# Patient Record
Sex: FEMALE | Race: WHITE | Employment: PART TIME | URBAN - METROPOLITAN AREA
[De-identification: names, ages, dates, MRNs, and addresses within clinical notes are randomized per-mention and may not be internally consistent; named-entity substitution may affect disease eponyms.]

---

## 2018-05-10 ENCOUNTER — HOSPITAL ENCOUNTER (EMERGENCY)
Facility: CLINIC | Age: 48
Discharge: HOME | End: 2018-05-10
Attending: EMERGENCY MEDICINE | Admitting: EMERGENCY MEDICINE
Payer: COMMERCIAL

## 2018-05-10 VITALS
TEMPERATURE: 97.4 F | SYSTOLIC BLOOD PRESSURE: 100 MMHG | OXYGEN SATURATION: 96 % | RESPIRATION RATE: 13 BRPM | DIASTOLIC BLOOD PRESSURE: 91 MMHG

## 2018-05-10 DIAGNOSIS — R00.2 PALPITATIONS: ICD-10-CM

## 2018-05-10 LAB
ANION GAP SERPL CALCULATED.3IONS-SCNC: 7 MMOL/L (ref 3–14)
BASOPHILS # BLD AUTO: 0 10E9/L (ref 0–0.2)
BASOPHILS NFR BLD AUTO: 0.2 %
BUN SERPL-MCNC: 13 MG/DL (ref 7–30)
CALCIUM SERPL-MCNC: 8.9 MG/DL (ref 8.5–10.1)
CHLORIDE SERPL-SCNC: 107 MMOL/L (ref 94–109)
CO2 SERPL-SCNC: 27 MMOL/L (ref 20–32)
CREAT SERPL-MCNC: 0.74 MG/DL (ref 0.52–1.04)
DIFFERENTIAL METHOD BLD: NORMAL
EOSINOPHIL # BLD AUTO: 0.1 10E9/L (ref 0–0.7)
EOSINOPHIL NFR BLD AUTO: 1.9 %
ERYTHROCYTE [DISTWIDTH] IN BLOOD BY AUTOMATED COUNT: 12.2 % (ref 10–15)
GFR SERPL CREATININE-BSD FRML MDRD: 83 ML/MIN/1.7M2
GLUCOSE SERPL-MCNC: 112 MG/DL (ref 70–99)
HCT VFR BLD AUTO: 42.4 % (ref 35–47)
HGB BLD-MCNC: 14.5 G/DL (ref 11.7–15.7)
IMM GRANULOCYTES # BLD: 0 10E9/L (ref 0–0.4)
IMM GRANULOCYTES NFR BLD: 0.2 %
INTERPRETATION ECG - MUSE: NORMAL
LYMPHOCYTES # BLD AUTO: 1.5 10E9/L (ref 0.8–5.3)
LYMPHOCYTES NFR BLD AUTO: 36.9 %
MAGNESIUM SERPL-MCNC: 2.3 MG/DL (ref 1.6–2.3)
MCH RBC QN AUTO: 29.8 PG (ref 26.5–33)
MCHC RBC AUTO-ENTMCNC: 34.2 G/DL (ref 31.5–36.5)
MCV RBC AUTO: 87 FL (ref 78–100)
MONOCYTES # BLD AUTO: 0.3 10E9/L (ref 0–1.3)
MONOCYTES NFR BLD AUTO: 7 %
NEUTROPHILS # BLD AUTO: 2.2 10E9/L (ref 1.6–8.3)
NEUTROPHILS NFR BLD AUTO: 53.8 %
NRBC # BLD AUTO: 0 10*3/UL
NRBC BLD AUTO-RTO: 0 /100
PLATELET # BLD AUTO: 201 10E9/L (ref 150–450)
POTASSIUM SERPL-SCNC: 3.5 MMOL/L (ref 3.4–5.3)
RBC # BLD AUTO: 4.86 10E12/L (ref 3.8–5.2)
SODIUM SERPL-SCNC: 141 MMOL/L (ref 133–144)
WBC # BLD AUTO: 4.2 10E9/L (ref 4–11)

## 2018-05-10 PROCEDURE — 93005 ELECTROCARDIOGRAM TRACING: CPT

## 2018-05-10 PROCEDURE — 83735 ASSAY OF MAGNESIUM: CPT | Performed by: EMERGENCY MEDICINE

## 2018-05-10 PROCEDURE — 85025 COMPLETE CBC W/AUTO DIFF WBC: CPT | Performed by: EMERGENCY MEDICINE

## 2018-05-10 PROCEDURE — 0296T ZIO PATCH HOLTER: CPT | Performed by: EMERGENCY MEDICINE

## 2018-05-10 PROCEDURE — 80048 BASIC METABOLIC PNL TOTAL CA: CPT | Performed by: EMERGENCY MEDICINE

## 2018-05-10 PROCEDURE — 0298T ZZC EXT ECG > 48HR TO 21 DAY REVIEW AND INTERPRETATN: CPT | Performed by: INTERNAL MEDICINE

## 2018-05-10 PROCEDURE — 99285 EMERGENCY DEPT VISIT HI MDM: CPT | Mod: 25

## 2018-05-10 ASSESSMENT — ENCOUNTER SYMPTOMS
SHORTNESS OF BREATH: 0
DIARRHEA: 0
CHILLS: 0
PALPITATIONS: 1
VOMITING: 0
NAUSEA: 0
FEVER: 0
LIGHT-HEADEDNESS: 0

## 2018-05-10 NOTE — DISCHARGE INSTRUCTIONS
"  * Heart Palpitations    Palpitations refers to the feeling that your heart is beating hard, fast or irregular. Some people describe it as \"pounding\" or \"skipped beats\". Palpitations may occur in persons with heart disease, but can also occur in healthy persons. Your doctor does not believe that anything dangerous is causing your symptoms at this time.  Heart-Related Causes:    Arrhythmia (a change from the heart's normal rhythm)    Disease of the heart valves  Non-Heart-Related Causes:    Certain medicines (such as asthma inhalers and decongestants)    Some herbal supplements, energy drinks and pills, and weight loss pills    Illegal stimulant drugs (such as cocaine, crank, methamphetamine, PCP)    Caffeine, alcohol and tobacco    Medical conditions such as thyroid disease, anemia, anxiety and panic disorder  Sometimes the cause cannot be found.  Home Care:  1. Avoid excess caffeine, alcohol, tobacco and any stimulant drugs.  2. Tell your doctor about any prescription or over-the-counter or herbal medicines you take.  Follow Up  with your doctor or as advised by our staff.  Get Prompt Medical Attention  if any of the following occur together with palpitations:    Weakness, dizziness, light-headed or fainting    Chest pain or shortness of breath    Rapid heart rate (over 120 beats per minute, at rest)    Palpitations that lasts over 20 minutes    Weakness of an arm or leg or one side of the face    Difficulty with speech or vision    4330-7692 [employer]. [address] [place] [address]. All rights reserved. This information is not intended as a substitute for professional medical care. Always follow your healthcare professional's instructions.  This information has been modified by your health care provider with permission from the publisher.        "

## 2018-05-10 NOTE — ED PROVIDER NOTES
History     Chief Complaint:  Palpitations    The history is provided by the patient.      Katie Wilkinson is a 47 year old female with a history of supraventricular tachycardia with adenosine conversion who presents with palpitations. The patient states she woke up this morning with palpitations and promptly recorded her pulse, which she noted was 100 bpm. She reports she felt lightheaded as if she were going to faint. She notes she had to get her kids ready for school at the time, but continued to have the palpitations afterwards, so she came to the ED for further evaluation. The patient reports her heart felt like it was pounding and she continued to feel lightheaded en route to the ED. On presentation to the ED, she notes the lightheadedness has subsided and reports her heart feels like it is beating slower, but still does not feel normal. The patient denies any dyspnea or recent vomiting or diarrhea. Of note, the patient states her daughter is sick right now, and she has been taking Airborne as a preventative measure. She also denies any history of heart procedures or ablations.    Allergies:  Advil: cold symptoms  Aspirin: red blotches     Medications:    Estradiol patch  Progesterone  Magnesium  Testosterone cypionate  Vitamin D  Calcium    Past Medical History:    Palpitations  Supraventricular tachycardia  The patient denies any history of hypertension.    Past Surgical History:     section  The patient denies any heart procedures or ablations.    Family History:    Diabetes  Hypertension  Hyperlipidemia  Myocardial infarction x2  Arrhythmia    Social History:  Smoking status: No  Alcohol use: Yes, rarely  PCP: Madison Wylie  Presents to the ED with her spouse  Marital Status:  [2]     Review of Systems   Constitutional: Negative for chills and fever.   Respiratory: Negative for shortness of breath.    Cardiovascular: Positive for palpitations.   Gastrointestinal: Negative for  diarrhea, nausea and vomiting.   Neurological: Negative for light-headedness.   All other systems reviewed and are negative.    Physical Exam     Patient Vitals for the past 24 hrs:   BP Temp Temp src Heart Rate Resp SpO2   05/10/18 1045 97/83 - - 87 - 98 %   05/10/18 1030 103/75 - - 98 - 97 %   05/10/18 1015 96/77 - - 92 - 98 %   05/10/18 1000 105/83 - - 96 - 100 %   05/10/18 0945 104/81 - - 105 - 99 %   05/10/18 0944 - - - 93 15 98 %   05/10/18 0930 102/74 - - 94 12 100 %   05/10/18 0923 107/81 - - 96 - 100 %   05/10/18 0915 - 97.4  F (36.3  C) Oral - - 100 %   05/10/18 0914 - - - - - 100 %   05/10/18 0913 115/78 - - 115 18 100 %   05/10/18 0912 - - - - - 98 %   05/10/18 0911 115/78 - - - - -     Physical Exam  Constitutional: Patient appears well-developed and well-nourished. There is no acute distress.   Head: No external signs of trauma noted.  Neck: No JVD noted  Eyes: Pupils are equal, round, and reactive to light.   Cardiovascular: Normal rate, regular rhythm and normal heart sounds.  Exam reveals no gallop and no friction rub.  No murmur heard. Equal B/L peripheral pulses.  Pulmonary/Chest: Effort normal and breath sounds normal. No respiratory distress. Patient has no wheezes. Patient has no rales.   Abdominal: Soft. There is no tenderness.   Extremities: No edema noted  Neurological: Patient is alert and oriented to person, place, and time.   Skin: Skin is warm and dry. There is no diaphoresis noted.   Psychiatric: The patient does seem mildly anxious    Emergency Department Course   ECG (09:13:37):  Rate 98 bpm. VT interval 160. QRS duration 84. QT/QTc 334/426. P-R-T axes 73 79 75. Normal sinus rhythm. Normal ECG. No significant change compared to ECG dated 6/30/15. Interpreted at 0915 by Freddy Cook DO.    Laboratory:  CBC: WNL (WBC 4.2, HGB 14.5, )   BMP: Glucose 112 (H), o/w WNL (Creatinine 0.74)  Magnesium: 2.3    Emergency Department Course:  Past medical records, nursing notes,  and vitals reviewed.  0912: I performed an exam of the patient and obtained history, as documented above.  ECG performed, results above.  IV inserted and blood drawn. The patient was placed on continuous cardiac monitoring and pulse oximetry.    1051: I rechecked the patient. Explained findings to the patient and her spouse.    1103: Cardiology states they can place a Zio patch monitor here in the ED. I relayed this information to the patient. The patch was subsequently placed prior to discharge.    I rechecked the patient. Findings and plan explained to the patient. Patient discharged home with instructions regarding supportive care, medications, and reasons to return. The importance of close follow-up was reviewed.     Impression & Plan    Medical Decision Making:  This 47-year-old female patient presents the ED due to palpitations. Please see the HPI and exam for specifics. The patient has remained well in the ED. All of the recorded heart rates have shown improvement from her initial triage vital signs. We were able to get [facility] cardiology and they were able to place a Zio patch on the patient.  The patient has a cardiologist in [place] that she can see and was instructed to follow with her cardiologist in [place]. She has remained well in the ED. Anticipatory guidance given prior to discharge.    Impression:    ICD-10-CM   1. Palpitations R00.2     Disposition: Discharged to home    Discharge Medications: None    Madelyn Max  5/10/2018   [facility] EMERGENCY DEPARTMENT    Madelyn BELTRÁN, am serving as a scribe at 9:12 AM on 5/10/2018 to document services personally performed by Freddy Cook DO based on my observations and the provider's statements to me.      Freddy Cook DO  05/10/18 1140

## 2018-05-10 NOTE — ED AVS SNAPSHOT
" [facility] Emergency Department    [address] [place] [address]    Phone:  [phone]    Fax:  [phone]                                       Katie Wilkinson   MRN: [id number]    Department:  [facility] Emergency Department   Date of Visit:  5/10/2018           Patient Information     Date Of Birth          1970        Your diagnoses for this visit were:     Palpitations        You were seen by Freddy Cook DO.      Follow-up Information     Follow up with Your cardiologist. Call today.    Why:  To schedule follow up        Call Madison Wylie    Why:  As needed    Contact information:    [facility]  [address]   [place] [address]  [phone]          Follow up with [facility] Emergency Department.    Specialty:  EMERGENCY MEDICINE    Why:  If symptoms worsen    Contact information:    [address]  [facility] [address]  [phone]        Discharge Instructions         * Heart Palpitations    Palpitations refers to the feeling that your heart is beating hard, fast or irregular. Some people describe it as \"pounding\" or \"skipped beats\". Palpitations may occur in persons with heart disease, but can also occur in healthy persons. Your doctor does not believe that anything dangerous is causing your symptoms at this time.  Heart-Related Causes:    Arrhythmia (a change from the heart's normal rhythm)    Disease of the heart valves  Non-Heart-Related Causes:    Certain medicines (such as asthma inhalers and decongestants)    Some herbal supplements, energy drinks and pills, and weight loss pills    Illegal stimulant drugs (such as cocaine, crank, methamphetamine, PCP)    Caffeine, alcohol and tobacco    Medical conditions such as thyroid disease, anemia, anxiety and panic disorder  Sometimes the cause cannot be found.  Home Care:  1. Avoid excess caffeine, alcohol, tobacco and any stimulant " drugs.  2. Tell your doctor about any prescription or over-the-counter or herbal medicines you take.  Follow Up  with your doctor or as advised by our staff.  Get Prompt Medical Attention  if any of the following occur together with palpitations:    Weakness, dizziness, light-headed or fainting    Chest pain or shortness of breath    Rapid heart rate (over 120 beats per minute, at rest)    Palpitations that lasts over 20 minutes    Weakness of an arm or leg or one side of the face    Difficulty with speech or vision    8553-6123 [employer]. [address] [place] [address]. All rights reserved. This information is not intended as a substitute for professional medical care. Always follow your healthcare professional's instructions.  This information has been modified by your health care provider with permission from the publisher.          24 Hour Appointment Hotline       To make an appointment at any [facility], call [phone] ([phone]). If you don't have a family doctor or clinic, we will help you find one. [place] [place] clinics are conveniently located to serve the needs of you and your family.             Review of your medicines      Our records show that you are taking the medicines listed below. If these are incorrect, please call your family doctor or clinic.        Dose / Directions Last dose taken    ADRENAL PO        Take by mouth daily   Refills:  0        calcium carbonate 500 MG tablet   Commonly known as:  OS-ALDO 500 mg [place]. Ca   Dose:  500 mg        Take 500 mg by mouth daily   Refills:  0        cyanocobalamin 1000 MCG tablet   Commonly known as:  vitamin  B-12   Dose:  1000 mcg        Take 1,000 mcg by mouth daily   Refills:  0        estradiol 0.05 MG/24HR BIW patch   Commonly known as:  VIVELLE-DOT   Dose:  1 patch        Place 1 patch onto the skin twice a week   Refills:  0        magnesium 250 MG tablet   Dose:  1 tablet        Take 1 tablet by mouth daily    Refills:  0        PRENATAL #2 OR        daily   Refills:  0        Progesterone 100 MG Caps   Dose:  100 mg        Take 100 mg by mouth daily   Refills:  0        saccharomyces boulardii 250 MG capsule   Commonly known as:  FLORASTOR   Dose:  250 mg        Take 250 mg by mouth 2 times daily   Refills:  0        TESTOSTERONE CYPIONATE IM        Inject into the muscle once a week 0.05ML a week   Refills:  0        vitamin D 1000 units capsule   Dose:  2000 Units        Take 2,000 Units by mouth daily   Refills:  0                Procedures and tests performed during your visit     Basic metabolic panel    CBC with platelets differential    EKG 12 lead    Magnesium      Orders Needing Specimen Collection     None      Pending Results     Date and Time Order Name Status Description    5/10/2018 0917 EKG 12 lead Preliminary             Pending Culture Results     No orders found from 5/8/2018 to 5/11/2018.            Pending Results Instructions     If you had any lab results that were not finalized at the time of your Discharge, you can call the ED Lab Result RN at [phone]. You will be contacted by this team for any positive Lab results or changes in treatment. The nurses are available 7 days a week from 10A to 6:30P.  You can leave a message 24 hours per day and they will return your call.        Test Results From Your Hospital Stay        5/10/2018 10:32 AM      Component Results     Component Value Ref Range & Units Status    Sodium 141 133 - 144 mmol/L Final    Potassium 3.5 3.4 - 5.3 mmol/L Final    Chloride 107 94 - 109 mmol/L Final    Carbon Dioxide 27 20 - 32 mmol/L Final    Anion Gap 7 3 - 14 mmol/L Final    Glucose 112 (H) 70 - 99 mg/dL Final    Urea Nitrogen 13 7 - 30 mg/dL Final    Creatinine 0.74 0.52 - 1.04 mg/dL Final    GFR Estimate 83 >60 mL/min/1.7m2 Final    Non  GFR Calc    GFR Estimate If Black >90 >60 mL/min/1.7m2 Final    African American GFR Calc    Calcium 8.9 8.5 - 10.1  mg/dL Final         5/10/2018 10:32 AM      Component Results     Component Value Ref Range & Units Status    Magnesium 2.3 1.6 - 2.3 mg/dL Final         5/10/2018 10:11 AM      Component Results     Component Value Ref Range & Units Status    WBC 4.2 4.0 - 11.0 10e9/L Final    RBC Count 4.86 3.8 - 5.2 10e12/L Final    Hemoglobin 14.5 11.7 - 15.7 g/dL Final    Hematocrit 42.4 35.0 - 47.0 % Final    MCV 87 78 - 100 fl Final    MCH 29.8 26.5 - 33.0 pg Final    MCHC 34.2 31.5 - 36.5 g/dL Final    RDW 12.2 10.0 - 15.0 % Final    Platelet Count 201 150 - 450 10e9/L Final    Diff Method Automated Method  Final    % Neutrophils 53.8 % Final    % Lymphocytes 36.9 % Final    % Monocytes 7.0 % Final    % Eosinophils 1.9 % Final    % Basophils 0.2 % Final    % Immature Granulocytes 0.2 % Final    Nucleated RBCs 0 0 /100 Final    Absolute Neutrophil 2.2 1.6 - 8.3 10e9/L Final    Absolute Lymphocytes 1.5 0.8 - 5.3 10e9/L Final    Absolute Monocytes 0.3 0.0 - 1.3 10e9/L Final    Absolute Eosinophils 0.1 0.0 - 0.7 10e9/L Final    Absolute Basophils 0.0 0.0 - 0.2 10e9/L Final    Abs Immature Granulocytes 0.0 0 - 0.4 10e9/L Final    Absolute Nucleated RBC 0.0  Final                Clinical Quality Measure: Blood Pressure Screening     Your blood pressure was checked while you were in the emergency department today. The last reading we obtained was  BP: 97/83 . Please read the guidelines below about what these numbers mean and what you should do about them.  If your systolic blood pressure (the top number) is less than 120 and your diastolic blood pressure (the bottom number) is less than 80, then your blood pressure is normal. There is nothing more that you need to do about it.  If your systolic blood pressure (the top number) is 120-139 or your diastolic blood pressure (the bottom number) is 80-89, your blood pressure may be higher than it should be. You should have your blood pressure rechecked within a year by a primary care  "provider.  If your systolic blood pressure (the top number) is 140 or greater or your diastolic blood pressure (the bottom number) is 90 or greater, you may have high blood pressure. High blood pressure is treatable, but if left untreated over time it can put you at risk for heart attack, stroke, or kidney failure. You should have your blood pressure rechecked by a primary care provider within the next 4 weeks.  If your provider in the emergency department today gave you specific instructions to follow-up with your doctor or provider even sooner than that, you should follow that instruction and not wait for up to 4 weeks for your follow-up visit.        Thank you for choosing [facility]       Thank you for choosing [facility] for your care. Our goal is always to provide you with excellent care. Hearing back from our patients is one way we can continue to improve our services. Please take a few minutes to complete the written survey that you may receive in the mail after you visit with us. Thank you!        [employer] Information     [employer] lets you send messages to your doctor, view your test results, renew your prescriptions, schedule appointments and more. To sign up, go to [email or web address] . Click on \"Log in\" on the left side of the screen, which will take you to the Welcome page. Then click on \"Sign up Now\" on the right side of the page.     You will be asked to enter the access code listed below, as well as some personal information. Please follow the directions to create your username and password.     Your access code is: [email or web address]  Expires: 2018 11:25 AM     Your access code will  in 90 days. If you need help or a new code, please call your [facility] clinic or [phone].        Care EveryWhere ID     This is your Care EveryWhere ID. This could be used by other organizations to access your [facility] medical records  [id number]        Equal Access to Services     JB AREVALO: Antwon juarez " teresita Eddy, julieta sierra, thierno mckeon, harlan modi. So [facility] [phone].    ATENCIÓN: Si habla español, tiene a godinez disposición servicios gratuitos de asistencia lingüística. Llame al [phone].    We comply with applicable federal civil rights laws and [place] laws. We do not discriminate on the basis of race, color, national origin, age, disability, sex, sexual orientation, or gender identity.            After Visit Summary       This is your record. Keep this with you and show to your community pharmacist(s) and doctor(s) at your next visit.

## 2018-05-10 NOTE — ED AVS SNAPSHOT
[facility] Emergency Department    [address]    [facility] [address]    Phone:  [phone]    Fax:  [phone]                                       Katie Wilkinson   MRN: [id number]    Department:  [facility] Emergency Department   Date of Visit:  5/10/2018           After Visit Summary Signature Page     I have received my discharge instructions, and my questions have been answered. I have discussed any challenges I see with this plan with the nurse or doctor.    ..........................................................................................................................................  Patient/Patient Representative Signature      ..........................................................................................................................................  Patient Representative Print Name and Relationship to Patient    ..................................................               ................................................  Date                                            Time    ..........................................................................................................................................  Reviewed by Signature/Title    ...................................................              ..............................................  Date                                                            Time

## 2018-05-22 ENCOUNTER — TELEPHONE (OUTPATIENT)
Dept: CARDIOLOGY | Facility: CLINIC | Age: 48
End: 2018-05-22

## 2018-05-22 NOTE — TELEPHONE ENCOUNTER
Ruddy Belcher MD  [facility] Nurse                     Monitor was unremarkable.  Please update pt on results.  Ruddy          Attempted to call patient - number is invalid. SP

## 2018-05-31 ENCOUNTER — OFFICE VISIT (OUTPATIENT)
Dept: CARDIOLOGY | Facility: CLINIC | Age: 48
End: 2018-05-31
Payer: COMMERCIAL

## 2018-05-31 VITALS
WEIGHT: 109.5 LBS | BODY MASS INDEX: 19.4 KG/M2 | DIASTOLIC BLOOD PRESSURE: 70 MMHG | SYSTOLIC BLOOD PRESSURE: 107 MMHG | HEART RATE: 73 BPM | HEIGHT: 63 IN

## 2018-05-31 DIAGNOSIS — I47.10 SVT (SUPRAVENTRICULAR TACHYCARDIA) (H): Primary | ICD-10-CM

## 2018-05-31 PROCEDURE — 99214 OFFICE O/P EST MOD 30 MIN: CPT | Mod: 25 | Performed by: INTERNAL MEDICINE

## 2018-05-31 PROCEDURE — 93000 ELECTROCARDIOGRAM COMPLETE: CPT | Performed by: INTERNAL MEDICINE

## 2018-05-31 NOTE — MR AVS SNAPSHOT
After Visit Summary   5/31/2018    Katie Wilkinson    MRN: [id number]           Patient Information     Date Of Birth          1970        Visit Information        Provider Department      5/31/2018 10:45 AM Ruddy Belcher MD [facility]   [place]        Today's Diagnoses     SVT (supraventricular tachycardia) (H)    -  1       Follow-ups after your visit        Your next 10 appointments already scheduled     Jul 02, 2018  1:30 PM CDT   Ech Limited with [email or web address]   [facility] ([facility])    [address]  [facility] [id number] [phone]           1.  Please bring or wear a comfortable two-piece outfit. 2.  You may eat, drink and take your normal medicines. 3.  For any questions that cannot be answered, please contact the ordering physician ***Please check-in at the [place] [facility] located in [address] in the [facility] building. When you are finished registering, please go to [address] and have a seat. The technician will call your name for the test.            Jul 12, 2018  1:00 PM CDT   Ep 90 Minute with [email or web address]   [facility] ([facility])    [address]  [place] [id number] [phone]              Future tests that were ordered for you today     Open Future Orders        Priority Expected Expires Ordered    Echocardiogram Limited Routine 6/7/2018 5/31/2019 5/31/2018    EP Ablation Procedures Routine 7/12/2018 5/31/2019 5/31/2018            Who to contact     If you have questions or need follow up information about today's clinic visit or your schedule please contact [facility]   DARREN directly at [phone].  Normal or non-critical lab and imaging results will be communicated to you by MyChart, letter or phone within 4 business days after the clinic has received the  "results. If you do not hear from us within 7 days, please contact the clinic through [employer] or phone. If you have a critical or abnormal lab result, we will notify you by phone as soon as possible.  Submit refill requests through [employer] or call your pharmacy and they will forward the refill request to us. Please allow 3 business days for your refill to be completed.          Additional Information About Your Visit        [employer] Information     [employer] lets you send messages to your doctor, view your test results, renew your prescriptions, schedule appointments and more. To sign up, go to [email or web address] . Click on \"Log in\" on the left side of the screen, which will take you to the Welcome page. Then click on \"Sign up Now\" on the right side of the page.     You will be asked to enter the access code listed below, as well as some personal information. Please follow the directions to create your username and password.     Your access code is: [email or web address]  Expires: 2018 11:25 AM     Your access code will  in 90 days. If you need help or a new code, please call your [facility] clinic or [phone].        Care EveryWhere ID     This is your Care EveryWhere ID. This could be used by other organizations to access your [facility] medical records  [id number]        Your Vitals Were     Pulse Height BMI (Body Mass Index)             73 1.594 m (5' 2.76\") 19.55 kg/m2          Blood Pressure from Last 3 Encounters:   18 107/70   05/10/18 (!) 100/91   06/30/15 100/70    Weight from Last 3 Encounters:   18 49.7 kg (109 lb 8 oz)   06/30/15 46.3 kg (102 lb)              We Performed the Following     EKG 12-lead complete w/read - Clinics (performed today)        Primary Care Provider Office Phone # Fax #    Madison DALY Wylie [phone]       [facility] [address] [place] [address]        Equal Access to Services     [facility] DONNY AH: julieta Kimball " thierno sierramadwight earlcongkatia quintanillaolayinka joelgiorgio liramichael rian. So [facility] [phone].    ATENCIÓN: Si ed rodríguez, tiene a godinez disposición servicios gratuitos de asistencia lingüística. Magalie al [phone].    We comply with applicable federal civil rights laws and [place] laws. We do not discriminate on the basis of race, color, national origin, age, disability, sex, sexual orientation, or gender identity.            Thank you!     Thank you for choosing [facility] HEART [facility]  for your care. Our goal is always to provide you with excellent care. Hearing back from our patients is one way we can continue to improve our services. Please take a few minutes to complete the written survey that you may receive in the mail after your visit with us. Thank you!             Your Updated Medication List - Protect others around you: Learn how to safely use, store and throw away your medicines at www.disposemymeds.org.          This list is accurate as of 5/31/18 12:01 PM.  Always use your most recent med list.                   Brand Name Dispense Instructions for use Diagnosis    calcium carbonate 500 MG tablet    OS-ALDO 500 mg [place]. [place]     Take 500 mg by mouth daily        cyanocobalamin 1000 MCG tablet    vitamin  B-12     Take 1,000 mcg by mouth daily        estradiol 0.05 MG/24HR BIW patch    VIVELLE-DOT     Place 1 patch onto the skin twice a week        magnesium 250 MG tablet      Take 1 tablet by mouth daily        PRENATAL #2 OR      daily        Progesterone 100 MG Caps      Take 100 mg by mouth daily Take two tabs (200mg) daily.        saccharomyces boulardii 250 MG capsule    FLORASTOR     Take 250 mg by mouth 2 times daily        TESTOSTERONE CYPIONATE IM      Inject into the muscle once a week 0.05ML a week        vitamin D 1000 units capsule      Take 2,000 Units by mouth daily

## 2018-05-31 NOTE — LETTER
5/31/2018      [facility] [address] [place] [address]      RE: Katie TRIPLETT Jaja       Dear Colleague,    I had the pleasure of seeing Katie Wilkinson in the [facility].    Service Date: 05/31/2018      REASON FOR VISIT:  Evaluation of SVT.      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      I last saw the patient in 06/2015.  At that time, she was having frequent episodes of SVT, requiring ER visits and treatment with adenosine.  During our last visit, we discussed about possibility of ablation; however, she was not interested.  I prescribed metoprolol pill-in-the-pocket approach.      Since 2015 she informs that she continues to have frequent episodes of SVT.  She has an average of 3 episodes per month.  Most of the times they last for about 20 minutes and she is able to tolerate them well.      However on 05/10 she had another episode that was more sustained and lasted for over 2 hours.  She came to the ED for evaluation.  However, by the time she arrived in the ED, the episode terminated.      After discharge, she wore a Zio Patch monitor for 3 days which was unremarkable.  Today, she denies any symptoms such as chest pain, lightheadedness, near-syncope or syncopal episode.  EKG done here today shows sinus rhythm without signs of preexcitation.  Echocardiogram obtained in 2014 revealed EF of 55% and no significant valve disease.      ASSESSMENT AND PLAN:   1.  Symptomatic paroxysmal SVT.  She affirms that SVT is now affecting her lifestyle as she is always scared of having an episode of SVT.      We discussed options including start pharmacotherapy or pursue catheter ablation procedure.  I explained the procedure in detail.  She is not interested in starting medications as she is concerned about her blood pressure.  She affirms that sometimes her  systolic  blood pressure can be as low as 80 mmHg.      She understands there is a 1%-2% risk complication associated with ablation procedure and she would like to proceed.  We will make arrangements to have the procedure scheduled for .  We will also repeat a limited echo prior to ablation.         RUDDY CALDERÓN MD             D: 2018   T: 2018   MT: DAY      Name:     ANISHA LO   MRN:      [id number]        Account:      [id number]   :      1970           Service Date: 2018      Document: [id number]           Outpatient Encounter Prescriptions as of 2018   Medication Sig Dispense Refill     calcium carbonate (OS-ALDO 500 MG [place]. [place]) 500 MG tablet Take 500 mg by mouth daily       Cholecalciferol (VITAMIN D) 1000 UNITS capsule Take 2,000 Units by mouth daily       cyanocobalamin (VITAMIN  B-12) 1000 MCG tablet Take 1,000 mcg by mouth daily       estradiol (VIVELLE-DOT) 0.05 MG/24HR patch Place 1 patch onto the skin twice a week       magnesium 250 MG tablet Take 1 tablet by mouth daily       PRENATAL #2 OR daily       Progesterone 100 MG CAPS Take 100 mg by mouth daily Take two tabs (200mg) daily.       saccharomyces boulardii (FLORASTOR) 250 MG capsule Take 250 mg by mouth 2 times daily       TESTOSTERONE CYPIONATE IM Inject into the muscle once a week 0.05ML a week       [DISCONTINUED] Misc Natural Products (ADRENAL PO) Take by mouth daily       No facility-administered encounter medications on file as of 2018.        Again, thank you for allowing me to participate in the care of your patient.      Sincerely,    Ruddy Calderón MD     [facility]

## 2018-05-31 NOTE — PROGRESS NOTES
"[id number]    Electrophysiology/ Clinic Note         H&P and Plan:           Ruddy Belcher MD    Physical Exam:  Vitals: /70  Pulse 73  Ht 1.594 m (5' 2.76\")  Wt 49.7 kg (109 lb 8 oz)  BMI 19.55 kg/m2    Constitutional:  AAO x3.  Pt is in NAD.  HEAD: normocephalic.  SKIN: Skin normal color, texture and turgor with no lesions or eruptions.  Eyes: PERRL, EOMI.  ENT:  Supple, normal JVP. No lymphadenopathy or thyroid enlargement.  Chest:  CTAB.  Cardiac:  RRR, normal  S1 and S2.  No murmurs rubs or gallop.    Abdomen:  Normal BS.  Soft, non-tender and non-distended.  No rebound or guarding.    Extremities:  Pedious pulses palpable B/L.  No LE edema noticed.   Neurological: Strength and sensation grossly symmetric and intact throughout.       CURRENT MEDICATIONS:  Current Outpatient Prescriptions   Medication Sig Dispense Refill     calcium carbonate (OS-ALDO 500 MG [place]. CA) 500 MG tablet Take 500 mg by mouth daily       Cholecalciferol (VITAMIN D) 1000 UNITS capsule Take 2,000 Units by mouth daily       cyanocobalamin (VITAMIN  B-12) 1000 MCG tablet Take 1,000 mcg by mouth daily       estradiol (VIVELLE-DOT) 0.05 MG/24HR patch Place 1 patch onto the skin twice a week       magnesium 250 MG tablet Take 1 tablet by mouth daily       PRENATAL #2 OR daily       Progesterone 100 MG CAPS Take 100 mg by mouth daily Take two tabs (200mg) daily.       saccharomyces boulardii (FLORASTOR) 250 MG capsule Take 250 mg by mouth 2 times daily       TESTOSTERONE CYPIONATE IM Inject into the muscle once a week 0.05ML a week         ALLERGIES     Allergies   Allergen Reactions     Advil [Alkylamines]      Cold sx, red eyes     Aspirin      Red blotches       PAST MEDICAL HISTORY:  Past Medical History:   Diagnosis Date     Palpitations      SVT (supraventricular tachycardia) (H) 2015       PAST SURGICAL HISTORY:  Past Surgical History:   Procedure Laterality Date      SECTION      twins       FAMILY HISTORY:  Family " History   Problem Relation Age of Onset     DIABETES Mother      pre diabetic     Hypertension Mother      mild     Hypertension Father      Hyperlipidemia Father      Myocardial Infarction Father      x2     Arrhythmia Father      Other - See Comments Father       from pneumonia       SOCIAL HISTORY:  Social History     Social History     Marital status:      Spouse name: N/A     Number of children: N/A     Years of education: N/A     Social History Main Topics     Smoking status: Never Smoker     Smokeless tobacco: Never Used     Alcohol use Yes      Comment: occ, twice a month     Drug use: None     Sexual activity: Not Asked     Other Topics Concern     Caffeine Concern No     1 cup maria esther a day     Sleep Concern No     Stress Concern No     Weight Concern No     Special Diet Yes     tries to follow gluten free     Exercise Yes     running, weights x5 days a week     Seat Belt Yes     Social History Narrative       Review of Systems:  Skin:  Negative     Eyes:  Negative    ENT:  Negative    Respiratory:  Negative    Cardiovascular:    Positive for;palpitations;lightheadedness;dizziness  Gastroenterology: Negative    Genitourinary:  Negative    Musculoskeletal:  Negative    Neurologic:  Negative    Psychiatric:  Negative    Heme/Lymph/Imm:  Negative    Endocrine:  Negative        Recent Lab Results:  LIPID RESULTS:  No results found for: CHOL, HDL, LDL, TRIG, CHOLHDLRATIO    LIVER ENZYME RESULTS:  Lab Results   Component Value Date    AST 31 2011    ALT 23 2011       CBC RESULTS:  Lab Results   Component Value Date    WBC 4.2 05/10/2018    RBC 4.86 05/10/2018    HGB 14.5 05/10/2018    HCT 42.4 05/10/2018    MCV 87 05/10/2018    MCH 29.8 05/10/2018    MCHC 34.2 05/10/2018    RDW 12.2 05/10/2018     05/10/2018       BMP RESULTS:  Lab Results   Component Value Date     05/10/2018    POTASSIUM 3.5 05/10/2018    CHLORIDE 107 05/10/2018    CO2 27 05/10/2018    ANIONGAP 7 05/10/2018      (H) 05/10/2018    BUN 13 05/10/2018    CR 0.74 05/10/2018    GFRESTIMATED 83 05/10/2018    GFRESTBLACK >90 05/10/2018    ALDO 8.9 05/10/2018        A1C RESULTS:  No results found for: A1C    INR RESULTS:  No results found for: INR      ECHOCARDIOGRAM  No results found for this or any previous visit (from the past 8760 hour(s)).      Orders Placed This Encounter   Procedures     EKG 12-lead complete w/read - Clinics (performed today)     EP Ablation Procedures     Echocardiogram Limited     No orders of the defined types were placed in this encounter.    Medications Discontinued During This Encounter   Medication Reason     Misc Natural Products (ADRENAL PO) Stopped by Patient         Encounter Diagnosis   Name Primary?     SVT (supraventricular tachycardia) (H) Yes         CC  No referring provider defined for this encounter.

## 2018-05-31 NOTE — PROGRESS NOTES
Service Date: 05/31/2018      REASON FOR VISIT:  Evaluation of SVT.      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      I last saw the patient in 06/2015.  At that time, she was having frequent episodes of SVT, requiring ER visits and treatment with adenosine.  During our last visit, we discussed about possibility of ablation; however, she was not interested.  I prescribed metoprolol pill-in-the-pocket approach.      Since 2015 she informs that she continues to have frequent episodes of SVT.  She has an average of 3 episodes per month.  Most of the times they last for about 20 minutes and she is able to tolerate them well.      However on 05/10 she had another episode that was more sustained and lasted for over 2 hours.  She came to the ED for evaluation.  However, by the time she arrived in the ED, the episode terminated.      After discharge, she wore a Zio Patch monitor for 3 days which was unremarkable.  Today, she denies any symptoms such as chest pain, lightheadedness, near-syncope or syncopal episode.  EKG done here today shows sinus rhythm without signs of preexcitation.  Echocardiogram obtained in 2014 revealed EF of 55% and no significant valve disease.      ASSESSMENT AND PLAN:   1.  Symptomatic paroxysmal SVT.  She affirms that SVT is now affecting her lifestyle as she is always scared of having an episode of SVT.      We discussed options including start pharmacotherapy or pursue catheter ablation procedure.  I explained the procedure in detail.  She is not interested in starting medications as she is concerned about her blood pressure.  She affirms that sometimes her systolic  blood pressure can be as low as 80 mmHg.      She understands there is a 1%-2% risk complication associated with ablation procedure and she would like to proceed.  We will make arrangements to have the procedure scheduled for 07/12.  We  will also repeat a limited echo prior to ablation.         NIKO CALDERÓN MD             D: 2018   T: 2018   MT: DAY      Name:     ANISHA LO   MRN:      [id number]        Account:      [id number]   :      1970           Service Date: 2018      Document: [id number]

## 2018-05-31 NOTE — LETTER
"5/31/2018    [facility] [address]   [place] [address]    RE: Katie Wilkinson       Dear Colleague,    I had the pleasure of seeing Katie Wilkinson in the [facility].    [id number]    Electrophysiology/ Clinic Note         H&P and Plan:           Ruddy Belcher MD    Physical Exam:  Vitals: /70  Pulse 73  Ht 1.594 m (5' 2.76\")  Wt 49.7 kg (109 lb 8 oz)  BMI 19.55 kg/m2    Constitutional:  AAO x3.  Pt is in NAD.  HEAD: normocephalic.  SKIN: Skin normal color, texture and turgor with no lesions or eruptions.  Eyes: PERRL, EOMI.  ENT:  Supple, normal JVP. No lymphadenopathy or thyroid enlargement.  Chest:  CTAB.  Cardiac:  RRR, normal  S1 and S2.  No murmurs rubs or gallop.    Abdomen:  Normal BS.  Soft, non-tender and non-distended.  No rebound or guarding.    Extremities:  Pedious pulses palpable B/L.  No LE edema noticed.   Neurological: Strength and sensation grossly symmetric and intact throughout.       CURRENT MEDICATIONS:  Current Outpatient Prescriptions   Medication Sig Dispense Refill     calcium carbonate (OS-ALDO 500 MG [place]. [place]) 500 MG tablet Take 500 mg by mouth daily       Cholecalciferol (VITAMIN D) 1000 UNITS capsule Take 2,000 Units by mouth daily       cyanocobalamin (VITAMIN  B-12) 1000 MCG tablet Take 1,000 mcg by mouth daily       estradiol (VIVELLE-DOT) 0.05 MG/24HR patch Place 1 patch onto the skin twice a week       magnesium 250 MG tablet Take 1 tablet by mouth daily       PRENATAL #2 OR daily       Progesterone 100 MG CAPS Take 100 mg by mouth daily Take two tabs (200mg) daily.       saccharomyces boulardii (FLORASTOR) 250 MG capsule Take 250 mg by mouth 2 times daily       TESTOSTERONE CYPIONATE IM Inject into the muscle once a week 0.05ML a week         ALLERGIES     Allergies   Allergen Reactions     Advil [Alkylamines]      Cold sx, red eyes     Aspirin      Red blotches       PAST MEDICAL HISTORY:  Past Medical " History:   Diagnosis Date     Palpitations      SVT (supraventricular tachycardia) (H) 2015       PAST SURGICAL HISTORY:  Past Surgical History:   Procedure Laterality Date      SECTION      twins       FAMILY HISTORY:  Family History   Problem Relation Age of Onset     DIABETES Mother      pre diabetic     Hypertension Mother      mild     Hypertension Father      Hyperlipidemia Father      Myocardial Infarction Father      x2     Arrhythmia Father      Other - See Comments Father       from pneumonia       SOCIAL HISTORY:  Social History     Social History     Marital status:      Spouse name: N/A     Number of children: N/A     Years of education: N/A     Social History Main Topics     Smoking status: Never Smoker     Smokeless tobacco: Never Used     Alcohol use Yes      Comment: occ, twice a month     Drug use: None     Sexual activity: Not Asked     Other Topics Concern     Caffeine Concern No     1 cup maria esther a day     Sleep Concern No     Stress Concern No     Weight Concern No     Special Diet Yes     tries to follow gluten free     Exercise Yes     running, weights x5 days a week     Seat Belt Yes     Social History Narrative       Review of Systems:  Skin:  Negative     Eyes:  Negative    ENT:  Negative    Respiratory:  Negative    Cardiovascular:    Positive for;palpitations;lightheadedness;dizziness  Gastroenterology: Negative    Genitourinary:  Negative    Musculoskeletal:  Negative    Neurologic:  Negative    Psychiatric:  Negative    Heme/Lymph/Imm:  Negative    Endocrine:  Negative        Recent Lab Results:  LIPID RESULTS:  No results found for: CHOL, HDL, LDL, TRIG, CHOLHDLRATIO    LIVER ENZYME RESULTS:  Lab Results   Component Value Date    AST 31 2011    ALT 23 2011       CBC RESULTS:  Lab Results   Component Value Date    WBC 4.2 05/10/2018    RBC 4.86 05/10/2018    HGB 14.5 05/10/2018    HCT 42.4 05/10/2018    MCV 87 05/10/2018    MCH 29.8 05/10/2018    [facility]  34.2 05/10/2018    RDW 12.2 05/10/2018     05/10/2018       BMP RESULTS:  Lab Results   Component Value Date     05/10/2018    POTASSIUM 3.5 05/10/2018    CHLORIDE 107 05/10/2018    CO2 27 05/10/2018    ANIONGAP 7 05/10/2018     (H) 05/10/2018    BUN 13 05/10/2018    CR 0.74 05/10/2018    GFRESTIMATED 83 05/10/2018    GFRESTBLACK >90 05/10/2018    ALDO 8.9 05/10/2018        A1C RESULTS:  No results found for: A1C    INR RESULTS:  No results found for: INR      ECHOCARDIOGRAM  No results found for this or any previous visit (from the past 8760 hour(s)).      Orders Placed This Encounter   Procedures     EKG 12-lead complete w/read - Clinics (performed today)     EP Ablation Procedures     Echocardiogram Limited     No orders of the defined types were placed in this encounter.    Medications Discontinued During This Encounter   Medication Reason     Misc Natural Products (ADRENAL PO) Stopped by Patient         Encounter Diagnosis   Name Primary?     SVT (supraventricular tachycardia) (H) Yes         CC  No referring provider defined for this encounter.                Thank you for allowing me to participate in the care of your patient.      Sincerely,     Ruddy Belcher MD     [facility]    cc:   No referring provider defined for this encounter.

## 2018-07-02 ENCOUNTER — HOSPITAL ENCOUNTER (OUTPATIENT)
Dept: CARDIOLOGY | Facility: CLINIC | Age: 48
Discharge: HOME | End: 2018-07-02
Attending: INTERNAL MEDICINE | Admitting: INTERNAL MEDICINE
Payer: COMMERCIAL

## 2018-07-02 DIAGNOSIS — I47.10 SVT (SUPRAVENTRICULAR TACHYCARDIA) (H): ICD-10-CM

## 2018-07-02 PROCEDURE — 93306 TTE W/DOPPLER COMPLETE: CPT | Mod: 26 | Performed by: INTERNAL MEDICINE

## 2018-07-02 PROCEDURE — 93306 TTE W/DOPPLER COMPLETE: CPT

## 2018-07-05 ENCOUNTER — TELEPHONE (OUTPATIENT)
Dept: CARDIOLOGY | Facility: CLINIC | Age: 48
End: 2018-07-05

## 2018-07-18 ENCOUNTER — TELEPHONE (OUTPATIENT)
Dept: CARDIOLOGY | Facility: CLINIC | Age: 48
End: 2018-07-18

## 2018-07-18 RX ORDER — LIDOCAINE 40 MG/G
CREAM TOPICAL
Status: CANCELLED | OUTPATIENT
Start: 2018-07-18 | End: 2019-07-18

## 2018-07-18 RX ORDER — SODIUM CHLORIDE 450 MG/100ML
INJECTION, SOLUTION INTRAVENOUS CONTINUOUS
Status: CANCELLED | OUTPATIENT
Start: 2018-07-18 | End: 2019-07-18

## 2018-07-19 ENCOUNTER — APPOINTMENT (OUTPATIENT)
Dept: CARDIOLOGY | Facility: CLINIC | Age: 48
End: 2018-07-19
Attending: INTERNAL MEDICINE
Payer: COMMERCIAL

## 2018-07-19 ENCOUNTER — HOSPITAL ENCOUNTER (OUTPATIENT)
Facility: CLINIC | Age: 48
Discharge: HOME | End: 2018-07-19
Attending: INTERNAL MEDICINE | Admitting: INTERNAL MEDICINE
Payer: COMMERCIAL

## 2018-07-19 VITALS
RESPIRATION RATE: 16 BRPM | OXYGEN SATURATION: 98 % | WEIGHT: 109.3 LBS | DIASTOLIC BLOOD PRESSURE: 63 MMHG | TEMPERATURE: 97.5 F | HEART RATE: 77 BPM | BODY MASS INDEX: 19.37 KG/M2 | SYSTOLIC BLOOD PRESSURE: 91 MMHG | HEIGHT: 63 IN

## 2018-07-19 DIAGNOSIS — I47.10 SVT (SUPRAVENTRICULAR TACHYCARDIA) (H): ICD-10-CM

## 2018-07-19 LAB
ANION GAP SERPL CALCULATED.3IONS-SCNC: 7 MMOL/L (ref 3–14)
B-HCG SERPL-ACNC: 1 IU/L (ref 0–5)
BUN SERPL-MCNC: 14 MG/DL (ref 7–30)
CALCIUM SERPL-MCNC: 8.7 MG/DL (ref 8.5–10.1)
CHLORIDE SERPL-SCNC: 106 MMOL/L (ref 94–109)
CO2 SERPL-SCNC: 28 MMOL/L (ref 20–32)
CREAT SERPL-MCNC: 0.76 MG/DL (ref 0.52–1.04)
ERYTHROCYTE [DISTWIDTH] IN BLOOD BY AUTOMATED COUNT: 11.9 % (ref 10–15)
GFR SERPL CREATININE-BSD FRML MDRD: 81 ML/MIN/1.7M2
GLUCOSE SERPL-MCNC: 88 MG/DL (ref 70–99)
HCT VFR BLD AUTO: 39 % (ref 35–47)
HGB BLD-MCNC: 13.7 G/DL (ref 11.7–15.7)
MCH RBC QN AUTO: 29.8 PG (ref 26.5–33)
MCHC RBC AUTO-ENTMCNC: 35.1 G/DL (ref 31.5–36.5)
MCV RBC AUTO: 85 FL (ref 78–100)
PLATELET # BLD AUTO: 175 10E9/L (ref 150–450)
POTASSIUM SERPL-SCNC: 4.1 MMOL/L (ref 3.4–5.3)
RBC # BLD AUTO: 4.6 10E12/L (ref 3.8–5.2)
SODIUM SERPL-SCNC: 141 MMOL/L (ref 133–144)
WBC # BLD AUTO: 4.1 10E9/L (ref 4–11)

## 2018-07-19 PROCEDURE — 25000125 ZZHC RX 250: Performed by: INTERNAL MEDICINE

## 2018-07-19 PROCEDURE — 27210995 ZZH RX 272: Performed by: INTERNAL MEDICINE

## 2018-07-19 PROCEDURE — 93613 INTRACARDIAC EPHYS 3D MAPG: CPT | Performed by: INTERNAL MEDICINE

## 2018-07-19 PROCEDURE — 85027 COMPLETE CBC AUTOMATED: CPT | Performed by: INTERNAL MEDICINE

## 2018-07-19 PROCEDURE — 40000852 ZZH STATISTIC HEART CATH LAB OR EP LAB

## 2018-07-19 PROCEDURE — 93010 ELECTROCARDIOGRAM REPORT: CPT | Mod: 77 | Performed by: INTERNAL MEDICINE

## 2018-07-19 PROCEDURE — C1732 CATH, EP, DIAG/ABL, 3D/VECT: HCPCS

## 2018-07-19 PROCEDURE — 93653 COMPRE EP EVAL TX SVT: CPT | Performed by: INTERNAL MEDICINE

## 2018-07-19 PROCEDURE — 25000128 H RX IP 250 OP 636: Performed by: INTERNAL MEDICINE

## 2018-07-19 PROCEDURE — 27210795 ZZH PAD DEFIB QUICK CR4

## 2018-07-19 PROCEDURE — 80048 BASIC METABOLIC PNL TOTAL CA: CPT | Performed by: INTERNAL MEDICINE

## 2018-07-19 PROCEDURE — 40000235 ZZH STATISTIC TELEMETRY

## 2018-07-19 PROCEDURE — 93623 PRGRMD STIMJ&PACG IV RX NFS: CPT | Mod: 26 | Performed by: INTERNAL MEDICINE

## 2018-07-19 PROCEDURE — 93621 COMP EP EVL L PAC&REC C SINS: CPT | Mod: 26 | Performed by: INTERNAL MEDICINE

## 2018-07-19 PROCEDURE — 27210782 ZZH KIT EP TOTES DISP CR7

## 2018-07-19 PROCEDURE — 93621 COMP EP EVL L PAC&REC C SINS: CPT

## 2018-07-19 PROCEDURE — 99153 MOD SED SAME PHYS/QHP EA: CPT

## 2018-07-19 PROCEDURE — 93653 COMPRE EP EVAL TX SVT: CPT

## 2018-07-19 PROCEDURE — 84702 CHORIONIC GONADOTROPIN TEST: CPT | Performed by: INTERNAL MEDICINE

## 2018-07-19 PROCEDURE — 93623 PRGRMD STIMJ&PACG IV RX NFS: CPT

## 2018-07-19 PROCEDURE — C1730 CATH, EP, 19 OR FEW ELECT: HCPCS

## 2018-07-19 PROCEDURE — 93010 ELECTROCARDIOGRAM REPORT: CPT | Performed by: INTERNAL MEDICINE

## 2018-07-19 PROCEDURE — 99152 MOD SED SAME PHYS/QHP 5/>YRS: CPT | Performed by: INTERNAL MEDICINE

## 2018-07-19 PROCEDURE — 93005 ELECTROCARDIOGRAM TRACING: CPT

## 2018-07-19 PROCEDURE — 99152 MOD SED SAME PHYS/QHP 5/>YRS: CPT

## 2018-07-19 PROCEDURE — 93613 INTRACARDIAC EPHYS 3D MAPG: CPT

## 2018-07-19 PROCEDURE — C1893 INTRO/SHEATH, FIXED,NON-PEEL: HCPCS

## 2018-07-19 PROCEDURE — 27210796 ZZH PAD EXTRNAL REFRENCE CARDIAC MAPPING CR14

## 2018-07-19 PROCEDURE — 36415 COLL VENOUS BLD VENIPUNCTURE: CPT | Performed by: INTERNAL MEDICINE

## 2018-07-19 PROCEDURE — 40000065 ZZH STATISTIC EKG NON-CHARGEABLE

## 2018-07-19 RX ORDER — LIDOCAINE HYDROCHLORIDE 10 MG/ML
10-30 INJECTION, SOLUTION EPIDURAL; INFILTRATION; INTRACAUDAL; PERINEURAL
Status: COMPLETED | OUTPATIENT
Start: 2018-07-19 | End: 2018-07-19

## 2018-07-19 RX ORDER — LIDOCAINE HYDROCHLORIDE 10 MG/ML
10-30 INJECTION, SOLUTION EPIDURAL; INFILTRATION; INTRACAUDAL; PERINEURAL
Status: DISCONTINUED | OUTPATIENT
Start: 2018-07-19 | End: 2018-07-19 | Stop reason: HOSPADM

## 2018-07-19 RX ORDER — IBUTILIDE FUMARATE 1 MG/10ML
0.01 INJECTION, SOLUTION INTRAVENOUS
Status: DISCONTINUED | OUTPATIENT
Start: 2018-07-19 | End: 2018-07-19 | Stop reason: HOSPADM

## 2018-07-19 RX ORDER — HEPARIN SODIUM 1000 [USP'U]/ML
1000-10000 INJECTION, SOLUTION INTRAVENOUS; SUBCUTANEOUS EVERY 5 MIN PRN
Status: DISCONTINUED | OUTPATIENT
Start: 2018-07-19 | End: 2018-07-19 | Stop reason: HOSPADM

## 2018-07-19 RX ORDER — NALOXONE HYDROCHLORIDE 0.4 MG/ML
.1-.4 INJECTION, SOLUTION INTRAMUSCULAR; INTRAVENOUS; SUBCUTANEOUS
Status: DISCONTINUED | OUTPATIENT
Start: 2018-07-19 | End: 2018-07-19 | Stop reason: HOSPADM

## 2018-07-19 RX ORDER — FLUMAZENIL 0.1 MG/ML
0.2 INJECTION, SOLUTION INTRAVENOUS
Status: DISCONTINUED | OUTPATIENT
Start: 2018-07-19 | End: 2018-07-19 | Stop reason: HOSPADM

## 2018-07-19 RX ORDER — FUROSEMIDE 10 MG/ML
20-100 INJECTION INTRAMUSCULAR; INTRAVENOUS
Status: DISCONTINUED | OUTPATIENT
Start: 2018-07-19 | End: 2018-07-19 | Stop reason: HOSPADM

## 2018-07-19 RX ORDER — MULTIPLE VITAMINS W/ MINERALS TAB 9MG-400MCG
1 TAB ORAL DAILY
COMMUNITY

## 2018-07-19 RX ORDER — LORAZEPAM 2 MG/ML
.5-2 INJECTION INTRAMUSCULAR EVERY 10 MIN PRN
Status: DISCONTINUED | OUTPATIENT
Start: 2018-07-19 | End: 2018-07-19 | Stop reason: HOSPADM

## 2018-07-19 RX ORDER — ADENOSINE 3 MG/ML
6-12 INJECTION, SOLUTION INTRAVENOUS EVERY 5 MIN PRN
Status: DISCONTINUED | OUTPATIENT
Start: 2018-07-19 | End: 2018-07-19 | Stop reason: HOSPADM

## 2018-07-19 RX ORDER — PROTAMINE SULFATE 10 MG/ML
5-40 INJECTION, SOLUTION INTRAVENOUS EVERY 10 MIN PRN
Status: DISCONTINUED | OUTPATIENT
Start: 2018-07-19 | End: 2018-07-19 | Stop reason: HOSPADM

## 2018-07-19 RX ORDER — DIPHENHYDRAMINE HYDROCHLORIDE 50 MG/ML
25-50 INJECTION INTRAMUSCULAR; INTRAVENOUS
Status: DISCONTINUED | OUTPATIENT
Start: 2018-07-19 | End: 2018-07-19 | Stop reason: HOSPADM

## 2018-07-19 RX ORDER — SODIUM CHLORIDE 450 MG/100ML
INJECTION, SOLUTION INTRAVENOUS CONTINUOUS
Status: DISCONTINUED | OUTPATIENT
Start: 2018-07-19 | End: 2018-07-19 | Stop reason: HOSPADM

## 2018-07-19 RX ORDER — BUPIVACAINE HYDROCHLORIDE 2.5 MG/ML
10-30 INJECTION, SOLUTION EPIDURAL; INFILTRATION; INTRACAUDAL
Status: DISCONTINUED | OUTPATIENT
Start: 2018-07-19 | End: 2018-07-19 | Stop reason: HOSPADM

## 2018-07-19 RX ORDER — MORPHINE SULFATE 2 MG/ML
1-2 INJECTION, SOLUTION INTRAMUSCULAR; INTRAVENOUS EVERY 5 MIN PRN
Status: DISCONTINUED | OUTPATIENT
Start: 2018-07-19 | End: 2018-07-19 | Stop reason: HOSPADM

## 2018-07-19 RX ORDER — LIDOCAINE HYDROCHLORIDE AND EPINEPHRINE 10; 10 MG/ML; UG/ML
10-30 INJECTION, SOLUTION INFILTRATION; PERINEURAL
Status: DISCONTINUED | OUTPATIENT
Start: 2018-07-19 | End: 2018-07-19 | Stop reason: HOSPADM

## 2018-07-19 RX ORDER — LIDOCAINE 40 MG/G
CREAM TOPICAL
Status: DISCONTINUED | OUTPATIENT
Start: 2018-07-19 | End: 2018-07-19 | Stop reason: HOSPADM

## 2018-07-19 RX ORDER — ATROPINE SULFATE 0.1 MG/ML
.5-1 INJECTION INTRAVENOUS
Status: DISCONTINUED | OUTPATIENT
Start: 2018-07-19 | End: 2018-07-19 | Stop reason: HOSPADM

## 2018-07-19 RX ORDER — FENTANYL CITRATE 50 UG/ML
25-50 INJECTION, SOLUTION INTRAMUSCULAR; INTRAVENOUS
Status: DISCONTINUED | OUTPATIENT
Start: 2018-07-19 | End: 2018-07-19 | Stop reason: HOSPADM

## 2018-07-19 RX ORDER — IBUTILIDE FUMARATE 1 MG/10ML
1 INJECTION, SOLUTION INTRAVENOUS
Status: DISCONTINUED | OUTPATIENT
Start: 2018-07-19 | End: 2018-07-19 | Stop reason: HOSPADM

## 2018-07-19 RX ORDER — PROTAMINE SULFATE 10 MG/ML
1-5 INJECTION, SOLUTION INTRAVENOUS
Status: DISCONTINUED | OUTPATIENT
Start: 2018-07-19 | End: 2018-07-19 | Stop reason: HOSPADM

## 2018-07-19 RX ORDER — ONDANSETRON 2 MG/ML
4 INJECTION INTRAMUSCULAR; INTRAVENOUS EVERY 4 HOURS PRN
Status: DISCONTINUED | OUTPATIENT
Start: 2018-07-19 | End: 2018-07-19 | Stop reason: HOSPADM

## 2018-07-19 RX ORDER — KETOROLAC TROMETHAMINE 30 MG/ML
15-30 INJECTION, SOLUTION INTRAMUSCULAR; INTRAVENOUS
Status: DISCONTINUED | OUTPATIENT
Start: 2018-07-19 | End: 2018-07-19 | Stop reason: HOSPADM

## 2018-07-19 RX ORDER — NALOXONE HYDROCHLORIDE 0.4 MG/ML
0.4 INJECTION, SOLUTION INTRAMUSCULAR; INTRAVENOUS; SUBCUTANEOUS EVERY 5 MIN PRN
Status: DISCONTINUED | OUTPATIENT
Start: 2018-07-19 | End: 2018-07-19 | Stop reason: HOSPADM

## 2018-07-19 RX ORDER — OXYCODONE AND ACETAMINOPHEN 5; 325 MG/1; MG/1
1 TABLET ORAL EVERY 4 HOURS PRN
Status: DISCONTINUED | OUTPATIENT
Start: 2018-07-19 | End: 2018-07-19 | Stop reason: HOSPADM

## 2018-07-19 RX ORDER — DOBUTAMINE HYDROCHLORIDE 200 MG/100ML
5-40 INJECTION INTRAVENOUS CONTINUOUS PRN
Status: DISCONTINUED | OUTPATIENT
Start: 2018-07-19 | End: 2018-07-19 | Stop reason: HOSPADM

## 2018-07-19 RX ADMIN — FENTANYL CITRATE 25 MCG: 50 INJECTION, SOLUTION INTRAMUSCULAR; INTRAVENOUS at 14:07

## 2018-07-19 RX ADMIN — SODIUM CHLORIDE: 4.5 INJECTION, SOLUTION INTRAVENOUS at 12:00

## 2018-07-19 RX ADMIN — MIDAZOLAM 0.5 MG: 1 INJECTION INTRAMUSCULAR; INTRAVENOUS at 14:08

## 2018-07-19 RX ADMIN — Medication 2 MCG/MIN: at 14:07

## 2018-07-19 RX ADMIN — FENTANYL CITRATE 25 MCG: 50 INJECTION, SOLUTION INTRAMUSCULAR; INTRAVENOUS at 13:08

## 2018-07-19 RX ADMIN — Medication 2 MCG/MIN: at 13:36

## 2018-07-19 RX ADMIN — LIDOCAINE HYDROCHLORIDE 30 ML: 10 INJECTION, SOLUTION EPIDURAL; INFILTRATION; INTRACAUDAL; PERINEURAL at 13:26

## 2018-07-19 RX ADMIN — MIDAZOLAM 0.5 MG: 1 INJECTION INTRAMUSCULAR; INTRAVENOUS at 13:08

## 2018-07-19 NOTE — PROGRESS NOTES
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       Patient has been on metoprolol pill-in-the-pocket approach.  She has about 3 episodes of SVT per month.  Most of the times they last for about 20 minutes, a month ago she had an episode that was more sustained, lasting for 2 hours.  She came to the ED for evaluation and by the time she arrived in the ED, the episode terminated.       Today, she denies any symptoms such as chest pain, lightheadedness, near-syncope or syncopal episode.  EKG done here today shows sinus rhythm without signs of preexcitation.  Echocardiogram obtained in 07/2018 revealed EF normal LV function and no significant valve disease.       ASSESSMENT AND PLAN:   1.  Symptomatic paroxysmal SVT.  She failed metoprolol pill-in-the-pocket approach and is not interested in taking regular medication.  She was referred for ablation.    Options were discussed including do nothing, pharmacotherapy or catheter ablation procedure.  Procedure was explained in details.  Patient understands the pro/cons of each option.  She understands there is 1-2% risk of complication associated with procedure.  She would like to pursue ablation and consent was signed.    Ruddy Belcher MD

## 2018-07-19 NOTE — IP AVS SNAPSHOT
[place] [address]    [place] [address]    Phone:  [phone]                                       After Visit Summary   7/19/2018    Katie Wilkinson    MRN: [id number]           After Visit Summary Signature Page     I have received my discharge instructions, and my questions have been answered. I have discussed any challenges I see with this plan with the nurse or doctor.    ..........................................................................................................................................  Patient/Patient Representative Signature      ..........................................................................................................................................  Patient Representative Print Name and Relationship to Patient    ..................................................               ................................................  Date                                            Time    ..........................................................................................................................................  Reviewed by Signature/Title    ...................................................              ..............................................  Date                                                            Time

## 2018-07-19 NOTE — DISCHARGE INSTRUCTIONS
SVT Ablation Discharge Instructions - Femoral     After you go home:      Have an adult stay with you until tomorrow.    You may resume your normal diet.       For 24 hours - due to the sedation you received:    Relax and take it easy.    Do NOT make any important or legal decisions.    Do NOT drive or operate machines at home or at work.    Do NOT drink alcohol.    Care of Groin Puncture Site:      For the first 24 hrs - check the puncture site every 1-2 hours while awake.    For 2 days, when you cough, sneeze, laugh or move your bowels, hold your hand over the puncture site and press firmly.    Remove the bandaid after 24 hours. If there is minor oozing, apply another bandaid and remove it after 12 hours.    It is normal to have a small bruise or pea size lump at the site.    You may shower tomorrow.  Do NOT take a bath, or use a hot tub or pool for at least 3 days. Do NOT scrub the site. Do not use lotion or powder near the puncture site.    Activity:            For 2 days:    No stooping or squatting    Do NOT do any heavy activity such as exercise, lifting, or straining.     No housework, yard work or any activity that make you sweat    Do NOT lift more than 10 pounds    Bleeding:      If you start bleeding from the site in your groin, lie down flat and press firmly on the site for 10 minutes.     Once bleeding stops, lay flat for 2 hours.    Call [facility] as soon as you can.       Call 911 right away if you have heavy bleeding or bleeding that does not stop.      Medicines:      Take your medications, including blood thinners, unless your provider tells you not to.    If you have stopped any medicines, check with your provider about when to restart them.    If you have pain or shortness of breath, you may take Advil (ibuprofen) or Tylenol (acetaminophen).    Follow Up Appointments:      An appointment has been set up for you for follow-up care    You will receive a phone call tomorrow morning from an  RN - Francis Walter or Tawana.    Call the clinic if:      You have increased pain or a large or growing hard lump around the site.    The site is red, swollen, hot or tender.    Blood or fluid is draining from the site.    You have chills or a fever greater than 101 F (38 C).    Your leg feels numb, cool or changes color.    Increased pain in the chest and/or groin.    Increased shortness of breath    Chest pain not relieved by Tylenol or Advil    New pain in the back or belly that you cannot control with Tylenol.    Recurrent irregular or fast heart rate lasting over 2 hours.    Any questions or concerns.    Heart rhythms:    You may have some irregular heartbeats. These feel very strong. They may make you feel that the fast heart rhythm is going to start again.  Give it time. The irregular beats should occur less often.       [facility]:    [phone] ( 8am-5pm M-F)  Francis Walter or Tawana    [phone] [facility] (7 days a week)

## 2018-07-19 NOTE — PROGRESS NOTES
Care Suites Arrival    Reason for Visit: SVT Ablation    A/O. Denies pain. Labs WNL. IV flds infusing. All questions & concerns addressed. Pt resting in bed, denies additional needs at this time, call light within reach.  at bedside.     1140 Dr Belcher at bedside to speak with pt & . Consent signed at this time.  1245 Pt to EP Lab at this time.

## 2018-07-19 NOTE — IP AVS SNAPSHOT
MRN:[id number]                      After Visit Summary   7/19/2018    Katie Wilkinson    MRN: [id number]           Visit Information        Department      7/19/2018 11:07 AM [facility]          Review of your medicines      CONTINUE these medicines which have NOT CHANGED        Dose / Directions    calcium carbonate 500 MG tablet   Commonly known as:  OS-ALDO 500 mg [place]. Ca        Dose:  500 mg   Take 500 mg by mouth every evening   Refills:  0       cyanocobalamin 1000 MCG tablet   Commonly known as:  vitamin  B-12        Dose:  1000 mcg   Take 1,000 mcg by mouth daily   Refills:  0       estradiol 0.05 MG/24HR BIW patch   Commonly known as:  VIVELLE-DOT        Dose:  1 patch   Place 1 patch onto the skin twice a week   Refills:  0       magnesium 250 MG tablet        Dose:  1 tablet   Take 1 tablet by mouth every evening   Refills:  0       milk thistle extract 140 MG Caps capsule        Dose:  140 mg   Take 140 mg by mouth daily   Refills:  0       Multi-vitamin Tabs tablet        Dose:  1 tablet   Take 1 tablet by mouth daily   Refills:  0       Progesterone 100 MG Caps        Dose:  100 mg   Take 100 mg by mouth every evening Take two tabs (200mg) daily.   Refills:  0       saccharomyces boulardii 250 MG capsule   Commonly known as:  FLORASTOR        Dose:  250 mg   Take 250 mg by mouth daily   Refills:  0       TESTOSTERONE CYPIONATE IM        Inject into the muscle once a week 0.05ML a week   Refills:  0       TURMERIC PO        Dose:  1 capsule   Take 1 capsule by mouth daily   Refills:  0       VITAMIN D-3 PO        Dose:  2000 Units   Take 2,000 Units by mouth every evening   Refills:  0                Protect others around you: Learn how to safely use, store and throw away your medicines at www.disposemymeds.org.         Follow-ups after your visit        Your next 10 appointments already scheduled     Aug 16, 2018 11:00 AM [facility] EP RETURN with Mary  Wild Prieto PA-C   [facility] ([facility])    [id number]  [facility] [address] [phone] OPT 2               Care Instructions        After Care Instructions     Discharge Instructions - Follow up with [facility] Nurse Practitioner          Follow up with [facility] Nurse Practitioner at [facility] of patient preference in 1 month.            Discharge Instructions - No driving for 1 day       No driving for 1 day and limit to necessary driving for 1 week.                  Further instructions from your care team       SVT Ablation Discharge Instructions - Femoral     After you go home:      Have an adult stay with you until tomorrow.    You may resume your normal diet.       For 24 hours - due to the sedation you received:    Relax and take it easy.    Do NOT make any important or legal decisions.    Do NOT drive or operate machines at home or at work.    Do NOT drink alcohol.    Care of Groin Puncture Site:      For the first 24 hrs - check the puncture site every 1-2 hours while awake.    For 2 days, when you cough, sneeze, laugh or move your bowels, hold your hand over the puncture site and press firmly.    Remove the bandaid after 24 hours. If there is minor oozing, apply another bandaid and remove it after 12 hours.    It is normal to have a small bruise or pea size lump at the site.    You may shower tomorrow.  Do NOT take a bath, or use a hot tub or pool for at least 3 days. Do NOT scrub the site. Do not use lotion or powder near the puncture site.    Activity:            For 2 days:    No stooping or squatting    Do NOT do any heavy activity such as exercise, lifting, or straining.     No housework, yard work or any activity that make you sweat    Do NOT lift more than 10 pounds    Bleeding:      If you start bleeding from the site in your groin, lie down flat and press firmly on the site for 10 minutes.     Once bleeding stops,  "lay flat for 2 hours.    Call [facility] as soon as you can.       Call 911 right away if you have heavy bleeding or bleeding that does not stop.      Medicines:      Take your medications, including blood thinners, unless your provider tells you not to.    If you have stopped any medicines, check with your provider about when to restart them.    If you have pain or shortness of breath, you may take Advil (ibuprofen) or Tylenol (acetaminophen).    Follow Up Appointments:      An appointment has been set up for you for follow-up care    You will receive a phone call tomorrow morning from an RN - Francis Walter or Tawana.    Call the clinic if:      You have increased pain or a large or growing hard lump around the site.    The site is red, swollen, hot or tender.    Blood or fluid is draining from the site.    You have chills or a fever greater than 101 F (38 C).    Your leg feels numb, cool or changes color.    Increased pain in the chest and/or groin.    Increased shortness of breath    Chest pain not relieved by Tylenol or Advil    New pain in the back or belly that you cannot control with Tylenol.    Recurrent irregular or fast heart rate lasting over 2 hours.    Any questions or concerns.    Heart rhythms:    You may have some irregular heartbeats. These feel very strong. They may make you feel that the fast heart rhythm is going to start again.  Give it time. The irregular beats should occur less often.       [facility]:    [phone] ( 8am-5pm M-F)  Francis Walter or Tawana    [phone] [facility] (7 days a week)           Additional Information About Your Visit        [employer] Information     [employer] lets you send messages to your doctor, view your test results, renew your prescriptions, schedule appointments and more. To sign up, go to [email or web address] . Click on \"Log in\" on the left side of the screen, which will take you to the Welcome page. Then click on \"Sign up Now\" on the right " "side of the page.     You will be asked to enter the access code listed below, as well as some personal information. Please follow the directions to create your username and password.     Your access code is: [email or web address]  Expires: 2018  1:26 PM     Your access code will  in 90 days. If you need help or a new code, please call your [facility] clinic or [phone].        Care EveryWhere ID     This is your Care EveryWhere ID. This could be used by other organizations to access your [facility] medical records  [id number]        Your Vitals Were     Blood Pressure Pulse Temperature Respirations Height Weight    87/65 77 97.5  F (36.4  C) (Oral) 16 1.6 m (5' 3\") 49.6 kg (109 lb 4.8 oz)    Pulse Oximetry BMI (Body Mass Index)                98% 19.36 kg/m2           Primary Care Provider Office Phone # Fax #    Madison Wylie [phone]      Equal Access to Services     [facility]IE [facility] : Hadii aad ku hadasho Soomaali, waaxda luqadaha, qaybta kaalmada adeegyada, harlan menjivar . So [facility] [phone].    ATENCIÓN: Si habla español, tiene a godinez disposición servicios gratuitos de asistencia lingüística. Llame al [phone].    We comply with applicable federal civil rights laws and [place] laws. We do not discriminate on the basis of race, color, national origin, age, disability, sex, sexual orientation, or gender identity.            Thank you!     Thank you for choosing [facility] for your care. Our goal is always to provide you with excellent care. Hearing back from our patients is one way we can continue to improve our services. Please take a few minutes to complete the written survey that you may receive in the mail after you visit with us. Thank you!             Medication List: This is a list of all your medications and when to take them. Check marks below indicate your daily home schedule. Keep this list as a reference.      Medications           Morning Afternoon " Evening Bedtime As Needed    calcium carbonate 500 MG tablet   Commonly known as:  OS-ALDO 500 mg [place]. Ca   Take 500 mg by mouth every evening                                cyanocobalamin 1000 MCG tablet   Commonly known as:  vitamin  B-12   Take 1,000 mcg by mouth daily                                estradiol 0.05 MG/24HR BIW patch   Commonly known as:  VIVELLE-DOT   Place 1 patch onto the skin twice a week                                magnesium 250 MG tablet   Take 1 tablet by mouth every evening                                milk thistle extract 140 MG Caps capsule   Take 140 mg by mouth daily                                Multi-vitamin Tabs tablet   Take 1 tablet by mouth daily                                Progesterone 100 MG Caps   Take 100 mg by mouth every evening Take two tabs (200mg) daily.                                saccharomyces boulardii 250 MG capsule   Commonly known as:  FLORASTOR   Take 250 mg by mouth daily                                TESTOSTERONE CYPIONATE IM   Inject into the muscle once a week 0.05ML a week                                TURMERIC PO   Take 1 capsule by mouth daily                                VITAMIN D-3 PO   Take 2,000 Units by mouth every evening

## 2018-07-19 NOTE — PROGRESS NOTES
Patient is status post SVT/AVNRT ablation.  Procedure details are below:    Indication: SVT  Findings: Successful SVT/AVNRT ablation   Estimated blood loss was minimal (< 20 cc)    No immediate complications are apparent.      Ruddy Belcher MD

## 2018-07-19 NOTE — PROGRESS NOTES
1445 Pt returned from EP Lab. Bandaid CDI to right groin puncture sites. No oozing or hematoma noted. Area soft & flat. Pt denies pain. Pt instructed on activity restrictions while on bedrest. Verbal understanding received from pt. No family present at this time.  1645 Pt taking diet & flds well. No complaints.  1715 Discharge teaching & instructions given to both pt &  w/ verbal understanding received. All questions & concerns addressed.  1720 Report given to Bri Mercado RN.

## 2018-07-20 ENCOUNTER — TELEPHONE (OUTPATIENT)
Dept: CARDIOLOGY | Facility: CLINIC | Age: 48
End: 2018-07-20

## 2018-07-20 NOTE — TELEPHONE ENCOUNTER
Call to pt to see how she is doing post SVT ablation.  Pt states that she feels fine and has no pain in groin or chest.  Pt has removed dressing from R groin and has been instructed that pt may shower and can put a band aid on site for a few days to protect from clothing.  Pt made aware to keep site clean and dry.  Made pt aware that if she does have any chest pain that she may take some Ibuprofen. Pt reminded also that the AVS includes that on call cardiologist phone number if needed and pt made aware to call nurse line also if needed. Jr

## 2018-07-20 NOTE — PROGRESS NOTES
1900 Off bedrest at 1845, ambulated to BR, scant amount drainage was present on 2x2 on (R) groin prior to ambulating. Area marked and slightly increased with activity. Dressing changed and site site observed to be stable. New 2x2 with tegaderm applied. Returned to bed for close observation.  1930 Ambulated again in dharmesh with no further drng noted. IV and monitor dc'd at this time  1950 Ready for discharge, instructions in hand. VSS. Stable groin site.

## 2018-07-23 LAB — INTERPRETATION ECG - MUSE: NORMAL

## 2018-07-24 LAB — INTERPRETATION ECG - MUSE: NORMAL

## 2018-08-16 ENCOUNTER — OFFICE VISIT (OUTPATIENT)
Dept: CARDIOLOGY | Facility: CLINIC | Age: 48
End: 2018-08-16
Payer: COMMERCIAL

## 2018-08-16 VITALS
BODY MASS INDEX: 19.88 KG/M2 | SYSTOLIC BLOOD PRESSURE: 103 MMHG | WEIGHT: 108 LBS | DIASTOLIC BLOOD PRESSURE: 71 MMHG | HEART RATE: 76 BPM | HEIGHT: 62 IN

## 2018-08-16 DIAGNOSIS — I47.10 SVT (SUPRAVENTRICULAR TACHYCARDIA) (H): Primary | ICD-10-CM

## 2018-08-16 PROCEDURE — 93000 ELECTROCARDIOGRAM COMPLETE: CPT | Performed by: PHYSICIAN ASSISTANT

## 2018-08-16 PROCEDURE — 99212 OFFICE O/P EST SF 10 MIN: CPT | Performed by: PHYSICIAN ASSISTANT

## 2018-08-16 NOTE — LETTER
"8/16/2018       Madison MSammy Wylie  [place] [address] [place] [address]      RE: Katie UZIEL Wilkinson       Dear Colleague,    I had the pleasure of seeing Katie Wilkinson in the [facility].    Service Date: 08/16/2018      HISTORY OF PRESENT ILLNESS:  I had the pleasure of meeting Katie today when she came for followup of her recent ablation.  She is a very pleasant 47-year-old who looks much younger than stated age who has had issues with palpitations for some time.  She was having frequent episodes of SVT requiring ER visits and treatment with adenosine when she saw Dr. Belcher 05/31.  He reviewed an ER visit 05/10 that lasted for over 2 hours.  She was back in sinus rhythm by the time she reached the Emergency Department.  As her symptoms were affecting her lifestyle quite a bit more, he discussed pharmacotherapy versus catheter ablation/EP study.      Therefore, on 07/19/2018 she underwent EP study with evidence of AV node re-entrant tachycardia.  She underwent modification of the slow pathway.  She was discharged home later that day.      Katie tells me that since then she has had no prolonged arrhythmias.  The first few days after the procedure she did feel her heart \"skip a bit.\"  This was asymptomatic otherwise and states that now she has not had any prolonged arrhythmias.      She denies problems with the right groin site, though does note a very small pea-sized lump.  She denies chest pain, pressure, tightness, edema, orthopnea, PND, lightheadedness, andrez syncope or palpitations.      Echocardiogram done prior to the procedure on 07/02/2018 showed normal ejection fraction of 55-60% with no wall motion abnormalities.  IVC was normal in size and reactivity.  There were no significant valvular abnormalities.      EKG today, which I overread, confirms sinus rhythm at 70 beats per minute.  No significant changes were seen compared with previous.      "   ASSESSMENT AND PLAN:     1.  AV node reentrant tachycardia.  As above, she underwent modification of the slow pathway .  She has done well since then with no issues and no recurrent arrhythmias.      At this time, I would not make any medication changes or plan outpatient followup with her.  I explained that if she did have any trouble or concerns with recurrent arrhythmias or any problems with lightheadedness or dizziness that she should contact us.  She voiced understanding.         MARY RAMÍREZ PA-C             D: 2018   T: 2018   MT: SOL      Name:     ANISHA LO   MRN:      [id number]        Account:      [id number]   :      1970           Service Date: 2018      Document: [id number]           Outpatient Encounter Prescriptions as of 2018   Medication Sig Dispense Refill     calcium carbonate (OS-ALDO 500 MG [place]. CA) 500 MG tablet Take 500 mg by mouth every evening        Cholecalciferol (VITAMIN D-3 PO) Take 2,000 Units by mouth every evening       cyanocobalamin (VITAMIN  B-12) 1000 MCG tablet Take 1,000 mcg by mouth daily       estradiol (VIVELLE-DOT) 0.05 MG/24HR patch Place 1 patch onto the skin twice a week       magnesium 250 MG tablet Take 1 tablet by mouth every evening        milk thistle extract 140 MG CAPS capsule Take 140 mg by mouth daily       multivitamin, therapeutic with minerals (MULTI-VITAMIN) TABS tablet Take 1 tablet by mouth daily       Progesterone 100 MG CAPS Take 100 mg by mouth every evening Take two tabs (200mg) daily.       saccharomyces boulardii (FLORASTOR) 250 MG capsule Take 250 mg by mouth daily        TESTOSTERONE CYPIONATE IM Inject into the muscle once a week 0.05ML a week       TURMERIC PO Take 1 capsule by mouth daily       No facility-administered encounter medications on file as of 2018.        Again, thank you for allowing me to participate in the care of your patient.      Sincerely,    Mary Webb  ELMIRA Prieto     [facility]

## 2018-08-16 NOTE — LETTER
8/16/2018    [facility] [address] [place] [address]    RE: Katie Wilkinson       Dear Colleague,    I had the pleasure of seeing Katie Wilkinson in the [facility].    HPI and Plan:   See dictation #[id number]    Orders Placed This Encounter   Procedures     EKG 12-lead complete w/read - Clinics (performed today)       No orders of the defined types were placed in this encounter.      There are no discontinued medications.      Encounter Diagnosis   Name Primary?     SVT (supraventricular tachycardia) (H) Yes       CURRENT MEDICATIONS:  Current Outpatient Prescriptions   Medication Sig Dispense Refill     calcium carbonate (OS-ALDO 500 MG [place]. CA) 500 MG tablet Take 500 mg by mouth every evening        Cholecalciferol (VITAMIN D-3 PO) Take 2,000 Units by mouth every evening       cyanocobalamin (VITAMIN  B-12) 1000 MCG tablet Take 1,000 mcg by mouth daily       estradiol (VIVELLE-DOT) 0.05 MG/24HR patch Place 1 patch onto the skin twice a week       magnesium 250 MG tablet Take 1 tablet by mouth every evening        milk thistle extract 140 MG CAPS capsule Take 140 mg by mouth daily       multivitamin, therapeutic with minerals (MULTI-VITAMIN) TABS tablet Take 1 tablet by mouth daily       Progesterone 100 MG CAPS Take 100 mg by mouth every evening Take two tabs (200mg) daily.       saccharomyces boulardii (FLORASTOR) 250 MG capsule Take 250 mg by mouth daily        TESTOSTERONE CYPIONATE IM Inject into the muscle once a week 0.05ML a week       TURMERIC PO Take 1 capsule by mouth daily         ALLERGIES     Allergies   Allergen Reactions     Advil [Ibuprofen] Other (See Comments)     Cold sx, red eyes     Aspirin Hives     Broccoli [Brassica Oleracea Italica] GI Disturbance       PAST MEDICAL HISTORY:  Past Medical History:   Diagnosis Date     Palpitations      SVT (supraventricular tachycardia) (H) 6/29/2015       PAST SURGICAL HISTORY:  Past  "Surgical History:   Procedure Laterality Date      SECTION      twins       FAMILY HISTORY:  Family History   Problem Relation Age of Onset     Diabetes Mother      pre diabetic     Hypertension Mother      mild     Hypertension Father      Hyperlipidemia Father      Myocardial Infarction Father      x2     Arrhythmia Father      Other - See Comments Father       from pneumonia       SOCIAL HISTORY:  Social History     Social History     Marital status:      Spouse name: N/A     Number of children: N/A     Years of education: N/A     Social History Main Topics     Smoking status: Never Smoker     Smokeless tobacco: Never Used     Alcohol use Yes      Comment: occ, twice a month     Drug use: None     Sexual activity: Not Asked     Other Topics Concern     Caffeine Concern No     1 cup maria esther a day     Sleep Concern No     Stress Concern No     Weight Concern No     Special Diet Yes     tries to follow gluten free     Exercise Yes     running, weights x5 days a week     Seat Belt Yes     Social History Narrative       Review of Systems:  Skin:  Negative       Eyes:  Negative      ENT:  Negative      Respiratory:  Negative for shortness of breath;dyspnea on exertion;cough     Cardiovascular:  Negative for;palpitations;dizziness;lightheadedness;chest pain;exercise intolerance;fatigue      Gastroenterology: Negative      Genitourinary:  Negative      Musculoskeletal:  Negative      Neurologic:  Negative      Psychiatric:  Negative      Heme/Lymph/Imm:  Negative      Endocrine:  Negative        Physical Exam:  Vitals: /71  Pulse 76  Ht 1.575 m (5' 2\")  Wt 49 kg (108 lb)  BMI 19.75 kg/m2    Constitutional:  cooperative, alert and oriented, well developed, well nourished, in no acute distress        Skin:  warm and dry to the touch, no apparent skin lesions or masses noted          Head:  normocephalic, no masses or lesions        Eyes:  pupils equal and round;conjunctivae and lids " unremarkable;sclera white        Lymph:      ENT:  no pallor or cyanosis, dentition good        Neck:  JVP normal;no carotid bruit        Respiratory:  normal breath sounds, clear to auscultation, normal A-P diameter, normal symmetry, normal respiratory excursion, no use of accessory muscles         Cardiac: regular rhythm, normal S1/S2, no S3 or S4, apical impulse not displaced, no murmurs, gallops or rubs                pulses full and equal                                        GI:  abdomen soft        Extremities and Muscular Skeletal:  no deformities, clubbing, cyanosis, erythema observed;no edema              Neurological:  no gross motor deficits        Psych:  Alert and Oriented x 3                Thank you for allowing me to participate in the care of your patient.      Sincerely,     Mary Prieto PA-C     [facility]    cc:   No referring provider defined for this encounter.

## 2018-08-16 NOTE — MR AVS SNAPSHOT
"              After Visit Summary   8/16/2018    Katie Wilkinson    MRN: [id number]           Patient Information     Date Of Birth          1970        Visit Information        Provider Department      8/16/2018 11:00 AM Mary Prieto PA-C [facility]        Today's Diagnoses     SVT (supraventricular tachycardia) (H)    -  1      Care Instructions    1. Reviewed your ablation procedure 7.19 - modification of the slow pathway of the AV Node for AV Node Reentrant Tachycardia (AVNRT, a type of SVT)    2. This went well and EKG today looked good!    3. No follow up needed unless any concerns, recurrent palpitations, etc - then call! [phone]    4. Echo done 7/2018 looked great - normal heart strength and valves          Follow-ups after your visit        Who to contact     If you have questions or need follow up information about today's clinic visit or your schedule please contact [facility] directly at [phone].  Normal or non-critical lab and imaging results will be communicated to you by MyChart, letter or phone within 4 business days after the clinic has received the results. If you do not hear from us within 7 days, please contact the clinic through MyChart or phone. If you have a critical or abnormal lab result, we will notify you by phone as soon as possible.  Submit refill requests through Goji or call your pharmacy and they will forward the refill request to us. Please allow 3 business days for your refill to be completed.          Additional Information About Your Visit        Care EveryWhere ID     This is your Care EveryWhere ID. This could be used by other organizations to access your [facility] medical records  [id number]        Your Vitals Were     Pulse Height BMI (Body Mass Index)             76 1.575 m (5' 2\") 19.75 kg/m2          Blood Pressure from Last 3 Encounters:   08/16/18 103/71 "   07/19/18 91/63   05/31/18 107/70    Weight from Last 3 Encounters:   08/16/18 49 kg (108 lb)   07/19/18 49.6 kg (109 lb 4.8 oz)   05/31/18 49.7 kg (109 lb 8 oz)              We Performed the Following     EKG 12-lead complete w/read - Clinics (performed today)        Primary Care Provider Office Phone # Fax #    [facility] [phone]       [facility] [place] [address] [place] [address]        Equal Access to Services     [facility]: Hadii aad ku hadasho Soomaali, waaxda luqadaha, qaybta kaalmada adelora, harlan menjivar . So [facility] [phone].    ATENCIÓN: Si habla español, tiene a godinez disposición servicios gratuitos de asistencia lingüística. [facility] [phone].    We comply with applicable federal civil rights laws and [place] laws. We do not discriminate on the basis of race, color, national origin, age, disability, sex, sexual orientation, or gender identity.            Thank you!     Thank you for choosing [facility]  for your care. Our goal is always to provide you with excellent care. Hearing back from our patients is one way we can continue to improve our services. Please take a few minutes to complete the written survey that you may receive in the mail after your visit with us. Thank you!             Your Updated Medication List - Protect others around you: Learn how to safely use, store and throw away your medicines at www.disposemymeds.org.          This list is accurate as of 8/16/18 11:34 AM.  Always use your most recent med list.                   Brand Name Dispense Instructions for use Diagnosis    calcium carbonate 500 MG tablet    OS-ALDO 500 mg [place]. Ca     Take 500 mg by mouth every evening        cyanocobalamin 1000 MCG tablet    vitamin  B-12     Take 1,000 mcg by mouth daily        estradiol 0.05 MG/24HR BIW patch    VIVELLE-DOT     Place 1 patch onto the skin twice a week        magnesium 250  MG tablet      Take 1 tablet by mouth every evening        milk thistle extract 140 MG Caps capsule      Take 140 mg by mouth daily        Multi-vitamin Tabs tablet      Take 1 tablet by mouth daily        Progesterone 100 MG Caps      Take 100 mg by mouth every evening Take two tabs (200mg) daily.        saccharomyces boulardii 250 MG capsule    FLORASTOR     Take 250 mg by mouth daily        TESTOSTERONE CYPIONATE IM      Inject into the muscle once a week 0.05ML a week        TURMERIC PO      Take 1 capsule by mouth daily        VITAMIN D-3 PO      Take 2,000 Units by mouth every evening

## 2018-08-16 NOTE — PATIENT INSTRUCTIONS
1. Reviewed your ablation procedure 7.19 - modification of the slow pathway of the AV Node for AV Node Reentrant Tachycardia (AVNRT, a type of SVT)    2. This went well and EKG today looked good!    3. No follow up needed unless any concerns, recurrent palpitations, etc - then call! [phone]    4. Echo done 7/2018 looked great - normal heart strength and valves

## 2018-08-16 NOTE — PROGRESS NOTES
"Service Date: 08/16/2018      HISTORY OF PRESENT ILLNESS:  I had the pleasure of meeting Katie today when she came for followup of her recent ablation.  She is a very pleasant 47-year-old who looks much younger than stated age who has had issues with palpitations for some time.  She was having frequent episodes of SVT requiring ER visits and treatment with adenosine when she saw Dr. Belcher 05/31.  He reviewed an ER visit 05/10 that lasted for over 2 hours.  She was back in sinus rhythm by the time she reached the Emergency Department.  As her symptoms were affecting her lifestyle quite a bit more, he discussed pharmacotherapy versus catheter ablation/EP study.      Therefore, on 07/19/2018 she underwent EP study with evidence of AV node re-entrant tachycardia.  She underwent modification of the slow pathway.  She was discharged home later that day.      Katie tells me that since then she has had no prolonged arrhythmias.  The first few days after the procedure she did feel her heart \"skip a bit.\"  This was asymptomatic otherwise and states that now she has not had any prolonged arrhythmias.      She denies problems with the right groin site, though does note a very small pea-sized lump.  She denies chest pain, pressure, tightness, edema, orthopnea, PND, lightheadedness, andrez syncope or palpitations.      Echocardiogram done prior to the procedure on 07/02/2018 showed normal ejection fraction of 55-60% with no wall motion abnormalities.  IVC was normal in size and reactivity.  There were no significant valvular abnormalities.      EKG today, which I overread, confirms sinus rhythm at 70 beats per minute.  No significant changes were seen compared with previous.        ASSESSMENT AND PLAN:     1.  AV node reentrant tachycardia.  As above, she underwent modification of the slow pathway 07/19.  She has done well since then with no issues and no recurrent arrhythmias.      At this time, I would not make any " medication changes or plan outpatient followup with her.  I explained that if she did have any trouble or concerns with recurrent arrhythmias or any problems with lightheadedness or dizziness that she should contact us.  She voiced understanding.         MEMO RAMÍREZ PA-C             D: 2018   T: 2018   MT: SOL      Name:     ANISHA LO   MRN:      [id number]        Account:      [id number]   :      1970           Service Date: 2018      Document: [id number]

## 2018-08-16 NOTE — PROGRESS NOTES
HPI and Plan:   See dictation #[id number]    Orders Placed This Encounter   Procedures     EKG 12-lead complete w/read - Clinics (performed today)       No orders of the defined types were placed in this encounter.      There are no discontinued medications.      Encounter Diagnosis   Name Primary?     SVT (supraventricular tachycardia) (H) Yes       CURRENT MEDICATIONS:  Current Outpatient Prescriptions   Medication Sig Dispense Refill     calcium carbonate (OS-ALDO 500 MG [place]. CA) 500 MG tablet Take 500 mg by mouth every evening        Cholecalciferol (VITAMIN D-3 PO) Take 2,000 Units by mouth every evening       cyanocobalamin (VITAMIN  B-12) 1000 MCG tablet Take 1,000 mcg by mouth daily       estradiol (VIVELLE-DOT) 0.05 MG/24HR patch Place 1 patch onto the skin twice a week       magnesium 250 MG tablet Take 1 tablet by mouth every evening        milk thistle extract 140 MG CAPS capsule Take 140 mg by mouth daily       multivitamin, therapeutic with minerals (MULTI-VITAMIN) TABS tablet Take 1 tablet by mouth daily       Progesterone 100 MG CAPS Take 100 mg by mouth every evening Take two tabs (200mg) daily.       saccharomyces boulardii (FLORASTOR) 250 MG capsule Take 250 mg by mouth daily        TESTOSTERONE CYPIONATE IM Inject into the muscle once a week 0.05ML a week       TURMERIC PO Take 1 capsule by mouth daily         ALLERGIES     Allergies   Allergen Reactions     Advil [Ibuprofen] Other (See Comments)     Cold sx, red eyes     Aspirin Hives     Broccoli [Brassica Oleracea Italica] GI Disturbance       PAST MEDICAL HISTORY:  Past Medical History:   Diagnosis Date     Palpitations      SVT (supraventricular tachycardia) (H) 2015       PAST SURGICAL HISTORY:  Past Surgical History:   Procedure Laterality Date      SECTION      twins       FAMILY HISTORY:  Family History   Problem Relation Age of Onset     Diabetes Mother      pre diabetic     Hypertension Mother      mild     Hypertension  "Father      Hyperlipidemia Father      Myocardial Infarction Father      x2     Arrhythmia Father      Other - See Comments Father       from pneumonia       SOCIAL HISTORY:  Social History     Social History     Marital status:      Spouse name: N/A     Number of children: N/A     Years of education: N/A     Social History Main Topics     Smoking status: Never Smoker     Smokeless tobacco: Never Used     Alcohol use Yes      Comment: occ, twice a month     Drug use: None     Sexual activity: Not Asked     Other Topics Concern     Caffeine Concern No     1 cup maria esther a day     Sleep Concern No     Stress Concern No     Weight Concern No     Special Diet Yes     tries to follow gluten free     Exercise Yes     running, weights x5 days a week     Seat Belt Yes     Social History Narrative       Review of Systems:  Skin:  Negative       Eyes:  Negative      ENT:  Negative      Respiratory:  Negative for shortness of breath;dyspnea on exertion;cough     Cardiovascular:  Negative for;palpitations;dizziness;lightheadedness;chest pain;exercise intolerance;fatigue      Gastroenterology: Negative      Genitourinary:  Negative      Musculoskeletal:  Negative      Neurologic:  Negative      Psychiatric:  Negative      Heme/Lymph/Imm:  Negative      Endocrine:  Negative        Physical Exam:  Vitals: /71  Pulse 76  Ht 1.575 m (5' 2\")  Wt 49 kg (108 lb)  BMI 19.75 kg/m2    Constitutional:  cooperative, alert and oriented, well developed, well nourished, in no acute distress        Skin:  warm and dry to the touch, no apparent skin lesions or masses noted          Head:  normocephalic, no masses or lesions        Eyes:  pupils equal and round;conjunctivae and lids unremarkable;sclera white        Lymph:      ENT:  no pallor or cyanosis, dentition good        Neck:  JVP normal;no carotid bruit        Respiratory:  normal breath sounds, clear to auscultation, normal A-P diameter, normal symmetry, normal " respiratory excursion, no use of accessory muscles         Cardiac: regular rhythm, normal S1/S2, no S3 or S4, apical impulse not displaced, no murmurs, gallops or rubs                pulses full and equal                                        GI:  abdomen soft        Extremities and Muscular Skeletal:  no deformities, clubbing, cyanosis, erythema observed;no edema              Neurological:  no gross motor deficits        Psych:  Alert and Oriented x 3

## 2021-01-03 NOTE — TELEPHONE ENCOUNTER
Orders placed. LM for pt to call back with questions and concerns prior to SVT Ablation tomorrow 7/19. ISABELelsonJENNY  
Pt returned call and is a bit on the nervous side about procedure tomorrow.  Pt is aware that she is to check in at 1100 and procedure is at 1300.  Discussed that procedure is scheduled for 2 hours, the time can vary how long.  Pt will then be on bedrest after procedure for 4 hours and then if groin site is stable after ambulation pt may go home.  Discussed that pt may have pain in groin and in chest and explained that it is asked that pt not lift, push or pull more than 10 pounds for 3-5 days. Explaining that this will help with the groin site healing.  Pt has a  to taker her to and from hospital.  Pt has no medications that need to be held.  F/U OV has been made for pt on 8/16 with Amanda at 11:00. Pt did state that she had an event to go to that night and was told that this was ok, but reminded pt that she may be tired and sore and also discharge may not be until 1900.  Pt states understanding and no further questions at this time. Jr   
98.4

## (undated) RX ORDER — FENTANYL CITRATE 50 UG/ML
INJECTION, SOLUTION INTRAMUSCULAR; INTRAVENOUS
Status: DISPENSED
Start: 2018-07-19

## (undated) RX ORDER — HEPARIN SODIUM 1000 [USP'U]/ML
INJECTION, SOLUTION INTRAVENOUS; SUBCUTANEOUS
Status: DISPENSED
Start: 2018-07-19

## (undated) RX ORDER — LIDOCAINE HYDROCHLORIDE 10 MG/ML
INJECTION, SOLUTION EPIDURAL; INFILTRATION; INTRACAUDAL; PERINEURAL
Status: DISPENSED
Start: 2018-07-19